# Patient Record
Sex: MALE | Race: OTHER | Employment: UNEMPLOYED | URBAN - NONMETROPOLITAN AREA
[De-identification: names, ages, dates, MRNs, and addresses within clinical notes are randomized per-mention and may not be internally consistent; named-entity substitution may affect disease eponyms.]

---

## 2021-05-17 ENCOUNTER — HOSPITAL ENCOUNTER (EMERGENCY)
Age: 26
Discharge: HOME | End: 2021-05-17
Attending: EMERGENCY MEDICINE
Payer: COMMERCIAL

## 2021-05-17 VITALS
HEART RATE: 90 BPM | WEIGHT: 210 LBS | SYSTOLIC BLOOD PRESSURE: 162 MMHG | OXYGEN SATURATION: 97 % | TEMPERATURE: 98.2 F | BODY MASS INDEX: 29.4 KG/M2 | RESPIRATION RATE: 18 BRPM | HEIGHT: 71 IN | DIASTOLIC BLOOD PRESSURE: 93 MMHG

## 2021-05-17 DIAGNOSIS — R30.0 DYSURIA: Primary | ICD-10-CM

## 2021-05-17 LAB
BACTERIA: ABNORMAL /HPF
BILIRUBIN URINE: NEGATIVE
BLOOD, URINE: ABNORMAL
CASTS 2: ABNORMAL /LPF
CASTS UA: ABNORMAL /LPF
CHARACTER, URINE: CLEAR
CHLAMYDIA TRACHOMATIS BY RT-PCR: NOT DETECTED
COLOR: ABNORMAL
CRYSTALS, UA: ABNORMAL
CT/NG SOURCE: NORMAL
EPITHELIAL CELLS, UA: ABNORMAL /HPF
GLUCOSE URINE: NEGATIVE MG/DL
KETONES, URINE: ABNORMAL
LEUKOCYTE ESTERASE, URINE: NEGATIVE
MISCELLANEOUS 2: ABNORMAL
NEISSERIA GONORRHOEAE BY RT-PCR: NOT DETECTED
NITRITE, URINE: NEGATIVE
PH UA: 5.5 (ref 5–9)
PROTEIN UA: ABNORMAL
RBC URINE: ABNORMAL /HPF
RENAL EPITHELIAL, UA: ABNORMAL
SPECIFIC GRAVITY, URINE: 1.03 (ref 1–1.03)
UROBILINOGEN, URINE: 0.2 EU/DL (ref 0–1)
WBC UA: ABNORMAL /HPF
YEAST: ABNORMAL

## 2021-05-17 PROCEDURE — 87491 CHLMYD TRACH DNA AMP PROBE: CPT

## 2021-05-17 PROCEDURE — 99281 EMR DPT VST MAYX REQ PHY/QHP: CPT

## 2021-05-17 PROCEDURE — 81001 URINALYSIS AUTO W/SCOPE: CPT

## 2021-05-17 PROCEDURE — 87591 N.GONORRHOEAE DNA AMP PROB: CPT

## 2021-05-17 RX ORDER — IBUPROFEN 400 MG/1
400 TABLET ORAL EVERY 6 HOURS PRN
Qty: 20 TABLET | Refills: 0 | Status: SHIPPED | OUTPATIENT
Start: 2021-05-17

## 2021-05-17 RX ORDER — CEFUROXIME AXETIL 500 MG/1
500 TABLET ORAL 2 TIMES DAILY
Qty: 14 TABLET | Refills: 0 | Status: SHIPPED | OUTPATIENT
Start: 2021-05-17 | End: 2021-05-24

## 2021-05-17 ASSESSMENT — PAIN DESCRIPTION - LOCATION: LOCATION: SCROTUM

## 2021-05-17 ASSESSMENT — PAIN SCALES - GENERAL: PAINLEVEL_OUTOF10: 2

## 2021-05-17 NOTE — ED PROVIDER NOTES
[place] ENCOUNTER          Pt Name: Katiana Garner  MRN: [id number]  [place] 1995  Date of evaluation: 5/17/2021  Treating Resident Physician: Di Moore MD  Supervising Physician: Regina Ndiaye COMPLAINT       Chief Complaint   Patient presents with    Other     burning with urination     History obtained from the patient. HISTORY OF PRESENT ILLNESS    HPI  Katiana Garner is a 26 y.o. male who presents to the emergency department for evaluation of burning on urination. Patient states that he stopped drinking alcohol 4 days ago, previously would have 8 ounces of 99 proof daily. After he stopped drinking alcohol, he noticed concern changed colors and was a bit darker. He started drinking more water thinking he is dehydrated. Urine color has returned to his baseline. Yesterday he began to have some burning on urination, and noted some sediment in his urine. He thinks there is also a little bit of blood in there. Patient has no suspicion of STDs. Patient denies any headaches, fever, nausea, vomiting, inability to sit still, visible or tactile hallucinations. The patient has no other acute complaints at this time. REVIEW OF SYSTEMS   Review of Systems   Constitutional: Negative for diaphoresis, fatigue and fever. HENT: Negative for congestion, rhinorrhea, sinus pain and sore throat. Eyes: Negative for photophobia and visual disturbance. Respiratory: Negative for chest tightness and shortness of breath. Gastrointestinal: Negative for abdominal distention, abdominal pain, constipation, diarrhea, nausea and vomiting. Genitourinary: Positive for dysuria, frequency and hematuria. Negative for decreased urine volume, discharge, flank pain, genital sores, penile pain, penile swelling, scrotal swelling, testicular pain and urgency. Musculoskeletal: Negative for back pain, neck pain and neck stiffness.    Neurological: Negative for tremors, weakness, light-headedness and headaches. PAST MEDICAL AND SURGICAL HISTORY   No past medical history on file. No past surgical history on file. MEDICATIONS   No current facility-administered medications for this encounter. Current Outpatient Medications:     cefUROXime (CEFTIN) 500 MG tablet, Take 1 tablet by mouth 2 times daily for 7 days, Disp: 14 tablet, Rfl: 0    ibuprofen (IBU) 400 MG tablet, Take 1 tablet by mouth every 6 hours as needed for Pain, Disp: 20 tablet, Rfl: 0      SOCIAL HISTORY     Social History     Social History Narrative    Not on file     Social History     Tobacco Use    Smoking status: Not on file   Substance Use Topics    Alcohol use: Not on file    Drug use: Not on file         ALLERGIES   No Known Allergies      FAMILY HISTORY   No family history on file. PREVIOUS RECORDS   Previous records reviewed: Past medical, past surgical, medications, allergies. PHYSICAL EXAM     ED Triage Vitals [05/17/21 1508]   BP Temp Temp Source Pulse Resp SpO2 Height Weight   (!) 162/93 98.2 °F (36.8 °C) Oral 90 18 97 % 5' 11\" (1.803 m) 210 lb (95.3 kg)     Initial vital signs and nursing assessment reviewed and Mildly hypertensive. Body mass index is 29.29 kg/m². Pulsoximetry is normal per my interpretation. Additional Vital Signs:  Vitals:    05/17/21 1508   BP: (!) 162/93   Pulse: 90   Resp: 18   Temp: 98.2 °F (36.8 °C)   SpO2: 97%       Physical Exam  Constitutional:       General: He is not in acute distress. Appearance: Normal appearance. He is not ill-appearing, toxic-appearing or diaphoretic. HENT:      Head: Normocephalic and atraumatic. Right Ear: External ear normal.      Left Ear: External ear normal.      Nose: Nose normal. No congestion. Mouth/Throat:      Mouth: Mucous membranes are moist.      Pharynx: Oropharynx is clear. Eyes:      General: No scleral icterus.      Extraocular Movements: Extraocular movements intact. Conjunctiva/sclera: Conjunctivae normal.      Pupils: Pupils are equal, round, and reactive to light. Cardiovascular:      Rate and Rhythm: Normal rate and regular rhythm. Pulses: Normal pulses. Heart sounds: Normal heart sounds. Pulmonary:      Effort: Pulmonary effort is normal. No respiratory distress. Breath sounds: Normal breath sounds. No wheezing. Abdominal:      General: Abdomen is flat. Bowel sounds are normal. There is no distension. Palpations: Abdomen is soft. Tenderness: There is no abdominal tenderness. There is no right CVA tenderness or left CVA tenderness. Musculoskeletal:         General: No swelling, tenderness or signs of injury. Normal range of motion. Cervical back: Normal range of motion and neck supple. No tenderness. Right lower leg: No edema. Left lower leg: No edema. Skin:     General: Skin is warm and dry. Capillary Refill: Capillary refill takes less than 2 seconds. Coloration: Skin is not jaundiced. Findings: No bruising or rash. Neurological:      Mental Status: He is alert. MEDICAL DECISION MAKING   Initial Assessment:   3 32-year-old male with no past medical history, presenting for burning on urination for the past 2 days. Physical exam unremarkable. Plan:    UA, GC   UA shows ketones, trace blood, protein.    Discharge patient with antibiotics for symptomatic cystitis   Strict return precautions discussed with patient        ED RESULTS   Laboratory results:  Labs Reviewed   URINE WITH REFLEXED MICRO - Abnormal; Notable for the following components:       Result Value    Ketones, Urine TRACE (*)     Blood, Urine TRACE (*)     Protein, UA TRACE (*)     Color, UA DK YELLOW (*)     All other components within normal limits   C. TRACHOMATIS / N. GONORRHOEAE, DNA       Radiologic studies results:  No orders to display       ED Medications administered this visit: Medications - No data to display      ED COURSE        Strict return precautions and follow up instructions were discussed with the patient prior to discharge, with which the patient agrees. MEDICATION CHANGES     Discharge Medication List as of 5/17/2021  4:06 PM      START taking these medications    Details   cefUROXime (CEFTIN) 500 MG tablet Take 1 tablet by mouth 2 times daily for 7 days, Disp-14 tablet, R-0Normal      ibuprofen (IBU) 400 MG tablet Take 1 tablet by mouth every 6 hours as needed for Pain, Disp-20 tablet, R-0Normal               FINAL DISPOSITION     Final diagnoses:   Dysuria     Condition: condition: good  Dispo: Discharge to home      This transcription was electronically signed. Parts of this transcriptions may have been dictated by use of voice recognition software and electronically transcribed, and parts may have been transcribed with the assistance of an ED scribe. The transcription may contain errors not detected in proofreading. Please refer to my supervising physician's documentation if my documentation differs.     Electronically Signed: Gloria Lima MD, 05/18/21, 7:20 AM          Gloria Lima MD  Resident  05/18/21 0846

## 2021-05-17 NOTE — ED PROVIDER NOTES
[address] [facility]  EMERGENCY MEDICINE ATTENDING ATTESTATION      Evaluation of Katiana Garner. Case discussed and care plan developed with resident physician. I agree with the resident physician documentation and plan as documented by her, except if my documentation differs. Patient seen, interviewed and examined by me. I reviewed the medical, surgical, family and social history, medications and allergies. I have reviewed the nursing documentation. I have reviewed the patient's vital signs and are abnormal from Hypertension per my interpretation. Body mass index is 29.29 kg/m². Pulsoxymetry is normal per my interpretation. Brief H&P   Patient c/o dysuria and frequent urination for the last 4 days. Patient also states he recently stopped drinking about a week ago and has not had anything with alcohol since. Denies symptoms related to withdrawal.  Denies urethral discharge. Patient states he recently moved from [facility] and does not currently have a PCP. Physical exam is notable for well appearing, nonfocal exam.  No CVA tenderness. Medical Decision Making   MDM:   1. Dysuria, possible UTI  Plan:    UA   Observation   We will likely start antibiotics and advise to follow-up with family medicine    Please see the resident physician completed note for final disposition except as documented on this attestation. I have reviewed and interpreted all available lab, radiology and ekg results available at the moment. Diagnosis, treatment and disposition plans were discussed and agreed upon by patient. This transcription was electronically signed. It was dictated by use of voice recognition software and electronically transcribed. The transcription may contain errors not detected in proofreading.      I performed direct supervision and was present for the critical portion following procedures: None  Critical care time on this case: None    Electronically signed

## 2021-05-18 ASSESSMENT — ENCOUNTER SYMPTOMS
VOMITING: 0
NAUSEA: 0
SHORTNESS OF BREATH: 0
ABDOMINAL DISTENTION: 0
CHEST TIGHTNESS: 0
PHOTOPHOBIA: 0
CONSTIPATION: 0
SORE THROAT: 0
SINUS PAIN: 0
RHINORRHEA: 0
BACK PAIN: 0
DIARRHEA: 0
ABDOMINAL PAIN: 0

## 2022-04-01 ENCOUNTER — HOSPITAL ENCOUNTER (EMERGENCY)
Age: 27
Discharge: HOME | End: 2022-04-01
Payer: COMMERCIAL

## 2022-04-01 ENCOUNTER — APPOINTMENT (OUTPATIENT)
Dept: GENERAL RADIOLOGY | Age: 27
End: 2022-04-01
Payer: COMMERCIAL

## 2022-04-01 VITALS
BODY MASS INDEX: 29.62 KG/M2 | HEIGHT: 69 IN | TEMPERATURE: 97.9 F | SYSTOLIC BLOOD PRESSURE: 153 MMHG | RESPIRATION RATE: 16 BRPM | HEART RATE: 109 BPM | WEIGHT: 200 LBS | DIASTOLIC BLOOD PRESSURE: 97 MMHG | OXYGEN SATURATION: 97 %

## 2022-04-01 DIAGNOSIS — S60.00XA CONTUSION OF LEFT HAND INCLUDING FINGERS, INITIAL ENCOUNTER: Primary | ICD-10-CM

## 2022-04-01 DIAGNOSIS — S60.222A CONTUSION OF LEFT HAND INCLUDING FINGERS, INITIAL ENCOUNTER: Primary | ICD-10-CM

## 2022-04-01 PROCEDURE — 73130 X-RAY EXAM OF HAND: CPT

## 2022-04-01 PROCEDURE — 99282 EMERGENCY DEPT VISIT SF MDM: CPT

## 2022-04-01 ASSESSMENT — PAIN DESCRIPTION - PAIN TYPE: TYPE: ACUTE PAIN

## 2022-04-01 ASSESSMENT — ENCOUNTER SYMPTOMS
SORE THROAT: 0
COLOR CHANGE: 0
SHORTNESS OF BREATH: 0
COUGH: 0
VOMITING: 0
DIARRHEA: 0

## 2022-04-01 ASSESSMENT — PAIN SCALES - GENERAL: PAINLEVEL_OUTOF10: 8

## 2022-04-01 ASSESSMENT — PAIN - FUNCTIONAL ASSESSMENT: PAIN_FUNCTIONAL_ASSESSMENT: 0-10

## 2022-04-01 ASSESSMENT — PAIN DESCRIPTION - ORIENTATION: ORIENTATION: LEFT

## 2022-04-01 ASSESSMENT — PAIN DESCRIPTION - DESCRIPTORS: DESCRIPTORS: BURNING

## 2022-04-01 ASSESSMENT — PAIN DESCRIPTION - FREQUENCY: FREQUENCY: CONTINUOUS

## 2022-04-01 ASSESSMENT — PAIN DESCRIPTION - LOCATION: LOCATION: HAND
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
## 2022-05-31 ENCOUNTER — APPOINTMENT (OUTPATIENT)
Dept: GENERAL RADIOLOGY | Age: 27
End: 2022-05-31
Payer: COMMERCIAL

## 2022-05-31 ENCOUNTER — HOSPITAL ENCOUNTER (EMERGENCY)
Age: 27
Discharge: HOME | End: 2022-05-31
Payer: COMMERCIAL

## 2022-05-31 VITALS
OXYGEN SATURATION: 98 % | HEART RATE: 87 BPM | SYSTOLIC BLOOD PRESSURE: 156 MMHG | DIASTOLIC BLOOD PRESSURE: 92 MMHG | TEMPERATURE: 98.1 F | RESPIRATION RATE: 16 BRPM

## 2022-05-31 DIAGNOSIS — S62.339A CLOSED BOXER'S FRACTURE, INITIAL ENCOUNTER: Primary | ICD-10-CM

## 2022-05-31 PROCEDURE — 73130 X-RAY EXAM OF HAND: CPT

## 2022-05-31 PROCEDURE — 6370000000 HC RX 637 (ALT 250 FOR IP): Performed by: PHYSICIAN ASSISTANT

## 2022-05-31 PROCEDURE — 99283 EMERGENCY DEPT VISIT LOW MDM: CPT

## 2022-05-31 RX ORDER — HYDROCODONE BITARTRATE AND ACETAMINOPHEN 5; 325 MG/1; MG/1
1 TABLET ORAL EVERY 6 HOURS PRN
Qty: 10 TABLET | Refills: 0 | Status: SHIPPED | OUTPATIENT
Start: 2022-05-31 | End: 2022-06-03

## 2022-05-31 RX ORDER — ONDANSETRON 4 MG/1
4 TABLET, ORALLY DISINTEGRATING ORAL ONCE
Status: COMPLETED | OUTPATIENT
Start: 2022-05-31 | End: 2022-05-31

## 2022-05-31 RX ORDER — HYDROCODONE BITARTRATE AND ACETAMINOPHEN 5; 325 MG/1; MG/1
1 TABLET ORAL ONCE
Status: COMPLETED | OUTPATIENT
Start: 2022-05-31 | End: 2022-05-31

## 2022-05-31 RX ORDER — ONDANSETRON 4 MG/1
4 TABLET, ORALLY DISINTEGRATING ORAL EVERY 8 HOURS PRN
Qty: 10 TABLET | Refills: 0 | Status: SHIPPED | OUTPATIENT
Start: 2022-05-31

## 2022-05-31 RX ADMIN — HYDROCODONE BITARTRATE AND ACETAMINOPHEN 1 TABLET: 5; 325 TABLET ORAL at 15:41

## 2022-05-31 RX ADMIN — ONDANSETRON 4 MG: 4 TABLET, ORALLY DISINTEGRATING ORAL at 15:41

## 2022-05-31 ASSESSMENT — ENCOUNTER SYMPTOMS
SHORTNESS OF BREATH: 0
RHINORRHEA: 0
VOMITING: 1
NAUSEA: 1
BACK PAIN: 0

## 2022-05-31 ASSESSMENT — PAIN SCALES - GENERAL: PAINLEVEL_OUTOF10: 10
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
## 2022-05-31 NOTE — ED PROVIDER NOTES
[facility] EMERGENCY DEPT      CHIEF COMPLAINT       Chief Complaint   Patient presents with    Hand Injury     left       Nurses Notes reviewed and I agree except as noted in the HPI. HISTORY OF PRESENT ILLNESS    Michelle Gruber is a 23 y.o. male who presents for left hand injury. Yesterday patient admits to drinking 4 twisted teas. He is not normally a drinker. Last evening after drinking he punched \"something\" with his left hand. He woke up with significant pain. The pain is causing nausea, sweats, and 1 episode of vomiting. He endorses mild intermittent paresthesias to the hand as well. Patient is right-hand dominant and denies any other injury. Patient denies headache, dizziness, neck pain, back pain, or other complaints. REVIEW OF SYSTEMS     Review of Systems   Constitutional: Positive for diaphoresis. Negative for fever. HENT: Negative for rhinorrhea. Eyes: Negative for visual disturbance. Respiratory: Negative for shortness of breath. Cardiovascular: Negative for chest pain. Gastrointestinal: Positive for nausea and vomiting. Musculoskeletal: Positive for myalgias. Negative for back pain, joint swelling and neck pain. Skin: Negative for rash and wound. Neurological: Negative for weakness and numbness. Psychiatric/Behavioral: Negative for confusion. PAST MEDICAL HISTORY    has no past medical history on file. SURGICAL HISTORY      has no past surgical history on file. CURRENT MEDICATIONS       Previous Medications    IBUPROFEN (IBU) 400 MG TABLET    Take 1 tablet by mouth every 6 hours as needed for Pain       ALLERGIES     has No Known Allergies. FAMILY HISTORY     has no family status information on file. family history is not on file. SOCIAL HISTORY        PHYSICAL EXAM     INITIAL VITALS:  oral temperature is 98.1 °F (36.7 °C). His blood pressure is 156/92 (abnormal) and his pulse is 87. His respiration is 16 and oxygen saturation is 98%. Physical Exam  Vitals and nursing note reviewed. Constitutional:       General: He is not in acute distress. Appearance: He is well-developed. He is not toxic-appearing or diaphoretic. HENT:      Head: Normocephalic and atraumatic. Right Ear: Hearing normal.      Left Ear: Hearing normal.      Nose: Nose normal.   Eyes:      General: Lids are normal.      Conjunctiva/sclera: Conjunctivae normal.   Neck:      Trachea: No tracheal deviation. Cardiovascular:      Rate and Rhythm: Normal rate and regular rhythm. Pulses:           Radial pulses are 2+ on the right side and 2+ on the left side. Pulmonary:      Effort: Pulmonary effort is normal. No tachypnea or respiratory distress. Breath sounds: No stridor. Abdominal:      General: There is no distension. Palpations: Abdomen is soft. Musculoskeletal:      Left wrist: Normal.      Left hand: Swelling, tenderness and bony tenderness present. Normal range of motion. Normal sensation. Hands:       Cervical back: No rigidity. Skin:     General: Skin is warm and dry. Coloration: Skin is not pale. Findings: No abrasion, ecchymosis or rash. Neurological:      Mental Status: He is alert and oriented to person, place, and time. GCS: GCS eye subscore is 4. GCS verbal subscore is 5. GCS motor subscore is 6. Sensory: No sensory deficit. Gait: Gait normal.   Psychiatric:         Speech: Speech normal.         Behavior: Behavior normal.         Thought Content:  Thought content normal.         DIFFERENTIAL DIAGNOSIS:   Including but not limited to: Boxer's fracture, contusion, sprain    DIAGNOSTIC RESULTS     EKG: All E[facility]'s are [facility] who either signs or Co-signs this chart in the absence of a cardiologist.  None    RADIOLOGY: non-plain film images(s) such as CT,Ultrasound and MRI are read by the radiologist.  Plain radiographic images are visualized and preliminarily interpreted by the emergency physician unless otherwise stated below. XR HAND LEFT (MIN 3 VIEWS)   Final Result   Angulated and minimally displaced fifth metacarpal fracture. **This report has been created using voice recognition software. It may contain minor errors which are inherent in voice recognition technology. **      Final report electronically signed by Dr. Goyo Kim MD on 5/31/2022 3:06 PM          LABS:   Labs Reviewed - No data to display    EMERGENCY DEPARTMENT COURSE:   Vitals:    Vitals:    05/31/22 1442   BP: (!) 156/92   Pulse: 87   Resp: 16   Temp: 98.1 °F (36.7 °C)   TempSrc: Oral   SpO2: 98%       Patient was seen in the emergency department during the global pandemic, when there was surge capacity and regional healthcare crisis. MDM:  The patient was seen and evaluated by me in the intake area. Vital signs were reviewed and noted stable. Physical exam revealed swelling, ecchymosis, and tenderness over the fourth and fifth metatarsal distal heads. Patient was able to demonstrate full range of motion of all 5 digits however with pain in the fourth and fifth. The patient was neurovascularly intact. Appropriate testing was ordered. Results were reviewed by me upon completion. Results showed angulated and minimally displaced fifth metacarpal fracture. Results were discussed with the patient and discharge plan was discussed. Patient was placed in an ulnar gutter splint and sling applied. I examined the patient post-splint application and the patient remained neurovascularly intact with good sensation and movement maintained distally. There were no signs of compartment symptoms before or after splint application. Patient medicated with Zofran and Norco.  Patient comfortable plan of discharge home and follow-up at [facility] tomorrow at 1:20.  I have given the patient strict written and verbal instructions about care at home, follow-up, and signs and symptoms of worsening of condition and they did verbalize understanding. CRITICAL CARE:   None    CONSULTS:  None    PROCEDURES:  None    FINAL IMPRESSION      1. Closed boxer's fracture, initial encounter          DISPOSITION/PLAN     1. Closed boxer's fracture, initial encounter        PATIENT REFERRED TO:  MelanieKrystin Farhana [address] [place] [id number]  [phone]    tomorrow, Wednesday June 1st at 1:20 PM.      DISCHARGE MEDICATIONS:  New Prescriptions    HYDROCODONE-ACETAMINOPHEN (NORCO) 5-325 MG PER TABLET    Take 1 tablet by mouth every 6 hours as needed for Pain for up to 3 days. Intended supply: 3 days.  Take lowest dose possible to manage pain    ONDANSETRON (ZOFRAN ODT) 4 MG DISINTEGRATING TABLET    Take 1 tablet by mouth every 8 hours as needed for Nausea       (Please note that portions of this note were completed with a voice recognition program.  Efforts were made to edit the dictations but occasionally words are mis-transcribed.)    Lyle Plata PA-C 05/31/22 4:02 PM    SALVADOR Gandhi PA-C  05/31/22 0291

## 2022-06-24 ENCOUNTER — HOSPITAL ENCOUNTER (EMERGENCY)
Age: 27
Discharge: HOME | End: 2022-06-24
Payer: COMMERCIAL

## 2022-06-24 VITALS
HEIGHT: 71 IN | RESPIRATION RATE: 16 BRPM | OXYGEN SATURATION: 99 % | WEIGHT: 190 LBS | SYSTOLIC BLOOD PRESSURE: 130 MMHG | BODY MASS INDEX: 26.6 KG/M2 | HEART RATE: 76 BPM | TEMPERATURE: 97.9 F | DIASTOLIC BLOOD PRESSURE: 96 MMHG

## 2022-06-24 DIAGNOSIS — S62.339S CLOSED BOXER'S FRACTURE, SEQUELA: ICD-10-CM

## 2022-06-24 DIAGNOSIS — Z48.02 VISIT FOR SUTURE REMOVAL: Primary | ICD-10-CM

## 2022-06-24 PROCEDURE — 99282 EMERGENCY DEPT VISIT SF MDM: CPT

## 2022-06-24 ASSESSMENT — PAIN DESCRIPTION - FREQUENCY: FREQUENCY: CONTINUOUS

## 2022-06-24 ASSESSMENT — ENCOUNTER SYMPTOMS: COLOR CHANGE: 0

## 2022-06-24 ASSESSMENT — PAIN - FUNCTIONAL ASSESSMENT: PAIN_FUNCTIONAL_ASSESSMENT: NONE - DENIES PAIN

## 2022-06-24 NOTE — ED TRIAGE NOTES
Patient presents to ER for suture removal of left hand. Patient reports having surgery on June 1rst. Patient reports girlfriend was going to take them out, but looks like his hand was eating the stitches.

## 2022-06-25 NOTE — ED PROVIDER NOTES
[facility] Emergency [address] [place] [address]       Chief Complaint   Patient presents with    Suture / Staple Removal       Nurses Notes reviewed and I agree except as noted in the HPI. HISTORY OF PRESENT ILLNESS    Concetta Rios is a 23 y.o. male who presents to the ED for evaluation of suture removal.  Patient notes he suffered a boxer's fracture near the end of the month of May, he had surgery performed at [address] [place] [address], he failed to follow-up due to problems with money. He notes no signs of infection associated with wound. He denies any numbness or tingling in his finger. He notes some tightness associate with sutures. He denies any significant past medical history. HPI was provided by the patient. REVIEW OF SYSTEMS     Review of Systems   Constitutional: Negative for activity change, chills and fever. Skin: Positive for wound. Negative for color change and rash. Hematological: Does not bruise/bleed easily. PAST MEDICAL HISTORY   No past medical history on file. SURGICALHISTORY      has no past surgical history on file. CURRENT MEDICATIONS       Discharge Medication List as of 6/24/2022  6:11 PM      CONTINUE these medications which have NOT CHANGED    Details   ondansetron (ZOFRAN ODT) 4 MG disintegrating tablet Take 1 tablet by mouth every 8 hours as needed for Nausea, Disp-10 tablet, R-0Print      ibuprofen (IBU) 400 MG tablet Take 1 tablet by mouth every 6 hours as needed for Pain, Disp-20 tablet, R-0Normal             ALLERGIES     has No Known Allergies. FAMILY HISTORY     has no family status information on file. family history is not on file.     SOCIAL HISTORY       Social History     Socioeconomic History    Marital status: Single     Spouse name: Not on file    Number of children: Not on file    Years of education: Not on file    Highest education level: Not on file   Occupational History    Not on file   Tobacco Use    Smoking status: Not on file    Smokeless tobacco: Not on file   Substance and Sexual Activity    Alcohol use: Not on file    Drug use: Not on file    Sexual activity: Not on file   Other Topics Concern    Not on file   Social History Narrative    Not on file     Social Determinants of Health     Financial Resource Strain:     Difficulty of Paying Living Expenses: Not on file   Food Insecurity:     Worried About Running Out of Food in the Last Year: Not on file    [place] of Food in the Last Year: Not on file   Transportation Needs:     Lack of Transportation (Medical): Not on file    Lack of Transportation (Non-Medical): Not on file   Physical Activity:     Days of Exercise per Week: Not on file    Minutes of Exercise per Session: Not on file   Stress:     Feeling of Stress : Not on file   Social Connections:     Frequency of Communication with Friends and Family: Not on file    Frequency of Social Gatherings with Friends and Family: Not on file    Attends Protestant Services: Not on file    Active Member of [address] [place] [address] [employer] or Organizations: Not on file    Attends Club or Organization Meetings: Not on file    Marital Status: Not on file   Intimate Partner Violence:     Fear of Current or Ex-Partner: Not on file    Emotionally Abused: Not on file    Physically Abused: Not on file    Sexually Abused: Not on file   Housing Stability:     Unable to Pay for Housing in the Last Year: Not on file    Number of [place] in the Last Year: Not on file    Unstable Housing in the Last Year: Not on file       PHYSICAL EXAM     INITIAL VITALS:  height is 5' 11\" (1.803 m) and weight is 190 lb (86.2 kg). His oral temperature is 97.9 °F (36.6 °C). His blood pressure is 130/96 (abnormal) and his pulse is 76. His respiration is 16 and oxygen saturation is 99%. Physical Exam  Vitals and nursing note reviewed. Constitutional:       Appearance: Normal appearance. He is well-developed. HENT:      Head: Normocephalic.       Right Ear: External ear normal.      Left Ear: External ear normal.      Nose: Nose normal.      Mouth/Throat:      Pharynx: Uvula midline. Eyes:      Conjunctiva/sclera: Conjunctivae normal.   Cardiovascular:      Rate and Rhythm: Normal rate and regular rhythm. Heart sounds: Normal heart sounds, S1 normal and S2 normal.   Pulmonary:      Effort: Pulmonary effort is normal. No respiratory distress. Breath sounds: Normal breath sounds. Chest:      Chest wall: No tenderness. Abdominal:      General: Bowel sounds are normal. There is no distension. Palpations: Abdomen is soft. Tenderness: There is no abdominal tenderness. Musculoskeletal:         General: Normal range of motion. Cervical back: Normal range of motion and neck supple. Skin:     General: Skin is warm and dry. Coloration: Skin is not pale. Findings: Wound (Wound to left outer hand well approximated with sutures.) present. No erythema or rash. Neurological:      Mental Status: He is alert and oriented to person, place, and time. Psychiatric:         Behavior: Behavior normal.         Thought Content: Thought content normal.         Judgment: Judgment normal.         DIFFERENTIAL DIAGNOSIS:   Suture removal    DIAGNOSTIC RESULTS          RADIOLOGY: non-plainfilm images(s) such as CT, Ultrasound and MRI are read by the radiologist.  Plain radiographic images are visualized and preliminarily interpreted by the emergency physician unless otherwise stated below. No orders to display         LABS:   Labs Reviewed - No data to display    EMERGENCY DEPARTMENT COURSE:   Vitals:    Vitals:    06/24/22 1715   BP: (!) 130/96   Pulse: 76   Resp: 16   Temp: 97.9 °F (36.6 °C)   TempSrc: Oral   SpO2: 99%   Weight: 190 lb (86.2 kg)   Height: 5' 11\" (1.803 m)       MDM    Patient was seen and evaluated in the emergency department, patient appeared to be in no acute distress, vital signs reviewed, no significant findings noted. Physical exam is completed, there is a well approximated wound to the left hand, sutures were removed with no difficulty, patient requested a new Ace bandage. He is advised to [facility] [place]. He verbalized understanding of plan of care. Medications - No data to display    Patient was seenindependently by myself. The patient's final impression and disposition and plan was determined by myself. CRITICAL CARE:   None    CONSULTS:  None    PROCEDURES:  None    FINAL IMPRESSION     1. Visit for suture removal    2. Closed boxer's fracture, sequela          DISPOSITION/PLAN   Patient discharged  PATIENT REFERREDTO:  Mike Delcid [address]  [facility] [address] [facility] [phone]  Go to   For follow up and evaluation      DISCHARGE MEDICATIONS:  Discharge Medication List as of 6/24/2022  6:11 PM          (Please note that portions of this note were completed with a voice recognition program.  Efforts were made to edit the dictations but occasionally words are mis-transcribed.)    Provider:  I personally performed the services described in the documentation,reviewed and edited the documentation which was dictated to the scribe in my presence, and it accurately records my words and actions.     Misha Herman CNP 06/24/22 8:32 PM    GELY Hahn - HAIM        [employer], APRN - CNP  06/24/22 2041

## 2022-07-09 ENCOUNTER — HOSPITAL ENCOUNTER (EMERGENCY)
Age: 27
Discharge: HOME | End: 2022-07-10
Payer: COMMERCIAL

## 2022-07-09 VITALS
OXYGEN SATURATION: 100 % | TEMPERATURE: 98.2 F | SYSTOLIC BLOOD PRESSURE: 142 MMHG | RESPIRATION RATE: 17 BRPM | DIASTOLIC BLOOD PRESSURE: 89 MMHG | HEART RATE: 83 BPM

## 2022-07-09 DIAGNOSIS — S61.511A WRIST LACERATION, RIGHT, INITIAL ENCOUNTER: Primary | ICD-10-CM

## 2022-07-09 PROCEDURE — 99283 EMERGENCY DEPT VISIT LOW MDM: CPT

## 2022-07-09 PROCEDURE — 12001 RPR S/N/AX/GEN/TRNK 2.5CM/<: CPT

## 2022-07-09 PROCEDURE — 6370000000 HC RX 637 (ALT 250 FOR IP): Performed by: NURSE PRACTITIONER

## 2022-07-09 RX ORDER — LIDOCAINE HYDROCHLORIDE 20 MG/ML
15 SOLUTION OROPHARYNGEAL ONCE
Status: COMPLETED | OUTPATIENT
Start: 2022-07-09 | End: 2022-07-09

## 2022-07-09 RX ADMIN — Medication 3 ML: at 22:38

## 2022-07-09 RX ADMIN — LIDOCAINE HYDROCHLORIDE 15 ML: 20 SOLUTION ORAL; TOPICAL at 22:38

## 2022-07-09 ASSESSMENT — PAIN SCALES - GENERAL: PAINLEVEL_OUTOF10: 8

## 2022-07-09 ASSESSMENT — PAIN DESCRIPTION - LOCATION: LOCATION: WRIST

## 2022-07-09 ASSESSMENT — PAIN DESCRIPTION - DESCRIPTORS: DESCRIPTORS: ACHING

## 2022-07-09 ASSESSMENT — PAIN DESCRIPTION - ORIENTATION: ORIENTATION: RIGHT

## 2022-07-09 ASSESSMENT — PAIN - FUNCTIONAL ASSESSMENT: PAIN_FUNCTIONAL_ASSESSMENT: 0-10

## 2022-07-10 NOTE — ED TRIAGE NOTES
Patient presents to ED with chief complaint of Right wrist laceration. Patient states that laceration is 2cm long, and he cut it on the lid of a soup can. Patient resting in bed. Aox4. Respirations easy and unlabored. No distress noted. Call light within reach.

## 2022-07-11 ASSESSMENT — ENCOUNTER SYMPTOMS
NAUSEA: 0
VOMITING: 0
BACK PAIN: 0
CHEST TIGHTNESS: 0
EYE REDNESS: 0
COUGH: 0
RHINORRHEA: 0
ABDOMINAL PAIN: 0

## 2022-07-11 NOTE — ED PROVIDER NOTES
[facility] Emergency Department    CHIEF COMPLAINT       Chief Complaint   Patient presents with    Laceration     right wrist       Nurses Notes reviewed and I agree except as noted in the HPI. HISTORY OF PRESENT ILLNESS    Erin Herrera sandra 23 y.o. male who presents to the ED for evaluation of a laceration to the right wrist.  Patient states he went to push the trash down in the can and there was a soup can lid there and it cut him. HPI was provided by the patient    REVIEW OF SYSTEMS     Review of Systems   Constitutional: Negative for chills, fatigue and fever. HENT: Negative for congestion, ear discharge, ear pain, postnasal drip and rhinorrhea. Eyes: Negative for redness. Respiratory: Negative for cough and chest tightness. Cardiovascular: Negative for chest pain and leg swelling. Gastrointestinal: Negative for abdominal pain, nausea and vomiting. Genitourinary: Negative for difficulty urinating, dysuria, enuresis, flank pain and hematuria. Musculoskeletal: Negative for back pain and joint swelling. Skin: Positive for wound. Negative for rash. Neurological: Negative for dizziness, light-headedness, numbness and headaches. Psychiatric/Behavioral: Negative for agitation, behavioral problems and confusion. All other systems negative except as noted. PAST MEDICAL HISTORY   No past medical history on file. SURGICALHISTORY      has no past surgical history on file. CURRENT MEDICATIONS       Discharge Medication List as of 7/9/2022 11:45 PM      CONTINUE these medications which have NOT CHANGED    Details   ondansetron (ZOFRAN ODT) 4 MG disintegrating tablet Take 1 tablet by mouth every 8 hours as needed for Nausea, Disp-10 tablet, R-0Print      ibuprofen (IBU) 400 MG tablet Take 1 tablet by mouth every 6 hours as needed for Pain, Disp-20 tablet, R-0Normal             ALLERGIES     has No Known Allergies.     FAMILY HISTORY     has no family status information on file.    family history is not on file. SOCIAL HISTORY       Social History     Socioeconomic History    Marital status: Single     Spouse name: Not on file    Number of children: Not on file    Years of education: Not on file    Highest education level: Not on file   Occupational History    Not on file   Tobacco Use    Smoking status: Not on file    Smokeless tobacco: Not on file   Substance and Sexual Activity    Alcohol use: Not on file    Drug use: Not on file    Sexual activity: Not on file   Other Topics Concern    Not on file   Social History Narrative    Not on file     Social Determinants of Health     Financial Resource Strain:     Difficulty of Paying Living Expenses: Not on file   Food Insecurity:     Worried About Running Out of Food in the Last Year: Not on file    [place] of Food in the Last Year: Not on file   Transportation Needs:     Lack of Transportation (Medical): Not on file    Lack of Transportation (Non-Medical): Not on file   Physical Activity:     Days of Exercise per Week: Not on file    Minutes of Exercise per Session: Not on file   Stress:     Feeling of Stress : Not on file   Social Connections:     Frequency of Communication with Friends and Family: Not on file    Frequency of Social Gatherings with Friends and Family: Not on file    Attends [employer] Services: Not on file    Active Member of [address] [place] [address] [employer] or Organizations: Not on file    Attends Club or Organization Meetings: Not on file    Marital Status: Not on file   Intimate Partner Violence:     Fear of Current or Ex-Partner: Not on file    Emotionally Abused: Not on file    Physically Abused: Not on file    Sexually Abused: Not on file   Housing Stability:     Unable to Pay for Housing in the Last Year: Not on file    Number of [place] in the Last Year: Not on file    Unstable Housing in the Last Year: Not on file       PHYSICAL EXAM     INITIAL VITALS:  temperature is 98.2 °F (36.8 °C).  His blood pressure is 142/89 (abnormal) and his pulse is 83. His respiration is 17 and oxygen saturation is 100%. Physical Exam  Constitutional:       Appearance: Normal appearance. He is well-developed. He is not ill-appearing. HENT:      Head: Normocephalic and atraumatic. Nose: Nose normal.      Mouth/Throat:      Mouth: Mucous membranes are moist.      Pharynx: Oropharynx is clear. Eyes:      Conjunctiva/sclera: Conjunctivae normal.   Cardiovascular:      Rate and Rhythm: Normal rate. Pulses: Normal pulses. Pulmonary:      Effort: Pulmonary effort is normal.   Abdominal:      Palpations: Abdomen is soft. Musculoskeletal:         General: Signs of injury present. Normal range of motion. Arms:       Cervical back: Normal range of motion. Skin:     General: Skin is warm and dry. Capillary Refill: Capillary refill takes less than 2 seconds. Neurological:      General: No focal deficit present. Mental Status: He is alert and oriented to person, place, and time. Psychiatric:         Behavior: Behavior normal.               DIFFERENTIAL DIAGNOSIS:   laceration    DIAGNOSTIC RESULTS     EKG: All EKG's are interpreted by the Emergency Department Physician who eithersigns or Co-signs this chart in the absence of a cardiologist.        RADIOLOGY: non-plainfilm images(s) such as CT, Ultrasound and MRI are read by the radiologist.  Plain radiographic images are visualized and preliminarily interpreted by the emergency physician unless otherwise stated below.   No orders to display         LABS:   Labs Reviewed - No data to display    EMERGENCY DEPARTMENT COURSE:   Vitals:    Vitals:    07/09/22 2202 07/09/22 2205   BP:  (!) 142/89   Pulse: 83    Resp: 17    Temp: 98.2 °F (36.8 °C)    SpO2: 100%                                       Internal Administration   First Dose      Second Dose           Last COVID Lab No results found for: SARS-COV-2, SARS-COV-2 RNA, SARS-COV-2, SARS-COV-2, SARS-COV-2 BY PCR, SARS-COV-2, SARS-COV-2, SARS-COV-2         [facility]    Patient was seen evaluate in the emergency room for a laceration to the right wrist.  No labs or imaging are necessary. Patient is treated with let and viscous lidocaine. Is applied to the wound. Wound is closed per the procedure note. Patient tolerated well. Discharged home stable condition after education on wound care. No notes of  Admission Criteria type on file. Medications   lidocaine-EPINEPHrine-tetracaine (LET) topical solution 3 mL syringe (3 mLs Topical Given 7/9/22 2238)   lidocaine viscous hcl (XYLOCAINE) 2 % solution 15 mL (15 mLs Oral Given 7/9/22 2238)       Please note that the patient was evaluated during a pandemic. All efforts were made for HIPPA compliance as well as provision of appropriate care. Patient was seen independently by myself. The patient's final impression and disposition and plan was determined by myself. Strict return precautions and follow up instructions were discussed with the patient prior to discharge, with which the patient agrees. Physical assessment findings, diagnostic testing(s) if applicable, and vital signs reviewed with patient/patient representative. Questions answered. Medications asdirected, including OTC medications for supportive care. Education provided on medications. Differential diagnosis(s) discussed with patient/patient representative. Home care/self care instructions reviewed withpatient/patient representative. Patient is to follow-up with family care provider in 2-3 days if no improvement. Patient is to go to the emergency department if symptoms worsen. Patient/patient representative isaware of care plan, questions answered, verbalizes understanding and is in agreement.      CRITICAL CARE:   None    CONSULTS:  None    PROCEDURES:  Lac Repair    Date/Time: 7/11/2022 3:45 AM  Performed by: GELY James - CNP  Authorized by: Arleth Fiore APRN - CNP     Consent:     Consent obtained:  Verbal    Consent given by:  Patient    Risks discussed:  Infection, pain, retained foreign body, vascular damage, poor wound healing, poor cosmetic result, need for additional repair and nerve damage    Alternatives discussed:  No treatment, delayed treatment, observation and referral  Anesthesia (see MAR for exact dosages): Anesthesia method:  Topical application    Topical anesthetic:  LET and lidocaine gel  Laceration details:     Location:  Shoulder/arm    Shoulder/arm location:  R lower arm    Length (cm):  1.5  Repair type:     Repair type:  Simple  Pre-procedure details:     Preparation:  Patient was prepped and draped in usual sterile fashion  Exploration:     Hemostasis achieved with:  Direct pressure    Wound exploration: wound explored through full range of motion and entire depth of wound probed and visualized      Wound extent: no areolar tissue violation noted, no fascia violation noted, no foreign bodies/material noted, no muscle damage noted, no nerve damage noted, no tendon damage noted, no underlying fracture noted and no vascular damage noted      Contaminated: no    Treatment:     Area cleansed with:  Jovita-Clens    Amount of cleaning:  Standard    Irrigation solution:  Sterile saline    Irrigation volume:  20    Irrigation method:  Syringe    Visualized foreign bodies/material removed: no    Skin repair:     Repair method:  Sutures    Suture size:  4-0    Suture material:  Nylon    Suture technique:  Simple interrupted    Number of sutures:  3  Approximation:     Approximation:  Close  Post-procedure details:     Dressing:  Bulky dressing    Patient tolerance of procedure: Tolerated well, no immediate complications        FINAL IMPRESSION     1.  Wrist laceration, right, initial encounter          DISPOSITION/PLAN   DISPOSITION Decision To Discharge 07/10/2022 12:10:05 AM      PATIENT REFERREDTO:  [facility]  [address]  [facility]  Schedule an appointment as soon as possible for a visit in 3 days  For follow up, For wound re-check      DISCHARGE MEDICATIONS:  Discharge Medication List as of 7/9/2022 11:45 PM          (Please note that portions of this note were completed with a voice recognition program.  Efforts were made to edit the dictations but occasionally words are mis-transcribed.)         GELY Camacho - GELY Caalbrese CNP  07/11/22 5646

## 2022-09-07 ENCOUNTER — HOSPITAL ENCOUNTER (EMERGENCY)
Age: 27
Discharge: HOME | End: 2022-09-07
Payer: COMMERCIAL

## 2022-09-07 VITALS
HEART RATE: 85 BPM | HEIGHT: 70 IN | DIASTOLIC BLOOD PRESSURE: 88 MMHG | BODY MASS INDEX: 32.93 KG/M2 | OXYGEN SATURATION: 98 % | TEMPERATURE: 99 F | RESPIRATION RATE: 16 BRPM | SYSTOLIC BLOOD PRESSURE: 146 MMHG | WEIGHT: 230 LBS

## 2022-09-07 DIAGNOSIS — U07.1 COVID-19: Primary | ICD-10-CM

## 2022-09-07 LAB
FLU A ANTIGEN: NEGATIVE
FLU B ANTIGEN: NEGATIVE
SARS-COV-2, NAAT: DETECTED

## 2022-09-07 PROCEDURE — 99283 EMERGENCY DEPT VISIT LOW MDM: CPT

## 2022-09-07 PROCEDURE — 87635 SARS-COV-2 COVID-19 AMP PRB: CPT

## 2022-09-07 PROCEDURE — 87804 INFLUENZA ASSAY W/OPTIC: CPT

## 2022-09-07 ASSESSMENT — ENCOUNTER SYMPTOMS
SHORTNESS OF BREATH: 0
BACK PAIN: 0
SORE THROAT: 1
EYES NEGATIVE: 1
RHINORRHEA: 0
ABDOMINAL PAIN: 0
COUGH: 1
VOMITING: 0
DIARRHEA: 0
NAUSEA: 0
SINUS PAIN: 0
SINUS PRESSURE: 0

## 2022-09-07 ASSESSMENT — PAIN DESCRIPTION - FREQUENCY: FREQUENCY: CONTINUOUS

## 2022-09-07 ASSESSMENT — PAIN DESCRIPTION - DESCRIPTORS: DESCRIPTORS: ACHING

## 2022-09-07 ASSESSMENT — PAIN DESCRIPTION - LOCATION: LOCATION: HEAD

## 2022-09-07 ASSESSMENT — PAIN - FUNCTIONAL ASSESSMENT: PAIN_FUNCTIONAL_ASSESSMENT: 0-10

## 2022-09-07 ASSESSMENT — PAIN DESCRIPTION - PAIN TYPE: TYPE: ACUTE PAIN

## 2022-09-07 NOTE — DISCHARGE INSTRUCTIONS
Recommend taking Vitamin C 500 mg twice daily, zinc  mg daily (for no more than one month), Vitamin D3 1000 units and slow release melatonin to help with the symptoms. Return to the Emergency Department immediately if you develop worsening shortness of breath, fatigue, chest pain, or passing out. Follow up with your primary care doctor by calling the office tomorrow    Prevention steps for  People with confirmed or suspected COVID-19 (including persons under investigation) who do not need to be hospitalized  and   People with confirmed COVID-19 who were hospitalized and determined to be medically stable to go home  Your healthcare provider and public health staff will evaluate whether you can be cared for at home. If it is determined that you do not need to be hospitalized and can be isolated at home, you will be monitored by staff from your local or state health department. You should follow the prevention steps below until a healthcare provider or local or state health department says you can return to your normal activities. Stay home except to get medical care  People who are mildly ill with COVID-19 are able to isolate at home during their illness. You should restrict activities outside your home, except for getting medical care. Do not go to work, school, or public areas. Avoid using public transportation, ride-sharing, or taxis. Separate yourself from other people and animals in your home  People: As much as possible, you should stay in a specific room and away from other people in your home. Also, you should use a separate bathroom, if available. Animals: You should restrict contact with pets and other animals while you are sick with COVID-19, just like you would around other people.  Although there have not been reports of pets or other animals becoming sick with COVID-19, it is still recommended that people sick with COVID-19 limit contact with animals until more information is known about the virus. When possible, have another member of your household care for your animals while you are sick. If you are sick with COVID-19, avoid contact with your pet, including petting, snuggling, being kissed or licked, and sharing food. If you must care for your pet or be around animals while you are sick, wash your hands before and after you interact with pets and wear a facemask. See COVID-19 and Animals for more information. Call ahead before visiting your doctor  If you have a medical appointment, call the healthcare provider and tell them that you have or may have COVID-19. This will help the healthcare providers office take steps to keep other people from getting infected or exposed. Wear a facemask  You should wear a facemask when you are around other people (e.g., sharing a room or vehicle) or pets and before you enter a healthcare providers office. If you are not able to wear a facemask (for example, because it causes trouble breathing), then people who live with you should not stay in the same room with you, or they should wear a facemask if they enter your room. Cover your coughs and sneezes  Cover your mouth and nose with a tissue when you cough or sneeze. Throw used tissues in a lined trash can. Immediately wash your hands with soap and water for at least 20 seconds or, if soap and water are not available, clean your hands with an alcohol-based hand  that contains at least 60% alcohol. Clean your hands often  Wash your hands often with soap and water for at least 20 seconds, especially after blowing your nose, coughing, or sneezing; going to the bathroom; and before eating or preparing food. If soap and water are not readily available, use an alcohol-based hand  with at least 60% alcohol, covering all surfaces of your hands and rubbing them together until they feel dry. Soap and water are the best option if hands are visibly dirty.  Avoid touching your eyes, nose, and mouth with secondary transmission to others is thought to be low. The decision to discontinue home isolation precautions should be made on a case-by-case basis, in consultation with healthcare providers and state and [facility] health departments. [employer] departments:  Guy Garcia [phone] [address] [facility]  Mitzi Phillip [phone]  [facility]  [address] [facility]  [address] [phone]  Sherrin Rinne [phone]  You can call [phone] or [phone] 24 hours a day. Recommended precautions for household members, intimate partners, and caregivers in a nonhealthcare setting1 of  A patient with symptomatic laboratory-confirmed COVID-19  or  A patient under investigation  Household members, intimate partners, and caregivers in a nonhealthcare setting may have close contact2 with a person with symptomatic, laboratory-confirmed COVID-19 or a person under investigation. Close contacts should monitor their health; they should call their healthcare provider right away if they develop symptoms suggestive of COVID-19 (e.g., fever, cough, shortness of breath) (see Interim [place] Guidance for Risk Assessment and Public Health Management of Persons with Potential Coronavirus Disease 2019 (COVID-19) Exposure in Travel-associated or [facility] Travelers.)  Close contacts should also follow these recommendations:  Make sure that you understand and can help the patient follow their healthcare providers instructions for medication(s) and care. You should help the patient with basic needs in the home and provide support for getting groceries, prescriptions, and other personal needs. Monitor the patients symptoms. If the patient is getting sicker, call his or her healthcare provider and tell them that the patient has laboratory-confirmed COVID-19.  This will help the healthcare providers office take steps to keep other people in the office or waiting room from getting infected. Ask the healthcare provider to call the local or state health department for additional guidance. If the patient has a medical emergency and you need to call 911, notify the dispatch personnel that the patient has, or is being evaluated for COVID-19. Household members should stay in another room or be  from the patient as much as possible. Household members should use a separate bedroom and bathroom, if available. Prohibit visitors who do not have an essential need to be in the home. Household members should care for any pets in the home. Do not handle pets or other animals while sick. For more information, see COVID-19 and Animals. Make sure that shared spaces in the home have good air flow, such as by an air conditioner or an opened window, weather permitting. Perform hand hygiene frequently. Wash your hands often with soap and water for at least 20 seconds or use an alcohol-based hand  that contains 60 to 95% alcohol, covering all surfaces of your hands and rubbing them together until they feel dry. Soap and water should be used preferentially if hands are visibly dirty. Avoid touching your eyes, nose, and mouth with unwashed hands. The patient should wear a facemask when you are around other people. If the patient is not able to wear a facemask (for example, because it causes trouble breathing), you, as the caregiver, should wear a mask when you are in the same room as the patient. Wear a disposable facemask and gloves when you touch or have contact with the patients blood, stool, or body fluids, such as saliva, sputum, nasal mucus, vomit, urine. Throw out disposable facemasks and gloves after using them. Do not reuse. When removing personal protective equipment, first remove and dispose of gloves.  Then, immediately clean your hands with soap and water or alcohol-based hand . Next, remove and dispose of facemask, and immediately clean your hands again with soap and water or alcohol-based hand . Avoid sharing household items with the patient. You should not share dishes, drinking glasses, cups, eating utensils, towels, bedding, or other items. After the patient uses these items, you should wash them thoroughly (see below [employer]). Clean all high-touch surfaces, such as counters, tabletops, doorknobs, bathroom fixtures, toilets, phones, keyboards, tablets, and bedside tables, every day. Also, clean any surfaces that may have blood, stool, or body fluids on them. Use a household cleaning spray or wipe, according to the label instructions. Labels contain instructions for safe and effective use of the cleaning product including precautions you should take when applying the product, such as wearing gloves and making sure you have good ventilation during use of the product. [address] thoroughly. Immediately remove and wash clothes or bedding that have blood, stool, or body fluids on them. Wear disposable gloves while handling soiled items and keep soiled items away from your body. Clean your hands (with soap and water or an alcohol-based hand ) immediately after removing your gloves. Read and follow directions on labels of laundry or clothing items and detergent. In general, using a normal laundry detergent according to washing machine instructions and dry thoroughly using the warmest temperatures recommended on the clothing label. Place all used disposable gloves, facemasks, and other contaminated items in a lined container before disposing of them with other household waste. Clean your hands (with soap and water or an alcohol-based hand ) immediately after handling these items. Soap and water should be used preferentially if hands are visibly dirty.    Discuss any additional questions with your state or local health department or healthcare provider. Check available hours when contacting your local health department.

## 2022-09-07 NOTE — ED PROVIDER NOTES
[address] [facility] [address] [address] EMERGENCY DEPT    EMERGENCY MEDICINE     Pt Name: Kateryna Domínguez  MRN: [id number]  [place] 1995  Date of evaluation: 9/7/2022  Provider: Wai Rcio PA-C    CHIEF COMPLAINT       Chief Complaint   Patient presents with    Covid Testing       HISTORY OF PRESENT ILLNESS    Kateryna Domínguez is a pleasant 23 y.o. male who presents to the emergency department for COVID testing. Patient states his roommate recently tested positive for COVID and yesterday he started having symptoms of mild cough and congestion. He states this morning he woke up with a headache, body aches, mild sore throat, cough and congestion. No complaints of chest pain, shortness of breath, nausea, vomiting, diarrhea, leg or arm pain or swelling. 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 No other concerns or complaints otherwise. Triage notes and Nursing notes were reviewed by myself. Any discrepancies are addressed above. PAST MEDICAL HISTORY   No past medical history on file. SURGICAL HISTORY     No past surgical history on file. CURRENT MEDICATIONS       Current Discharge Medication List        CONTINUE these medications which have NOT CHANGED    Details   ondansetron (ZOFRAN ODT) 4 MG disintegrating tablet Take 1 tablet by mouth every 8 hours as needed for Nausea  Qty: 10 tablet, Refills: 0      ibuprofen (IBU) 400 MG tablet Take 1 tablet by mouth every 6 hours as needed for Pain  Qty: 20 tablet, Refills: 0             ALLERGIES     Patient has no known allergies. FAMILY HISTORY     No family history on file. SOCIAL HISTORY       Social History     Socioeconomic History    Marital status: Single       REVIEW OF SYSTEMS     Review of Systems   Constitutional:  Negative for chills and fever. HENT:  Positive for congestion and sore throat. Negative for ear pain, rhinorrhea, sinus pressure and sinus pain. Eyes: Negative.     Respiratory: Positive for cough. Negative for shortness of breath. Cardiovascular:  Negative for chest pain and palpitations. Gastrointestinal:  Negative for abdominal pain, diarrhea, nausea and vomiting. Endocrine: Negative. Genitourinary:  Negative for dysuria and frequency. Musculoskeletal:  Positive for myalgias. Negative for back pain and neck pain. Skin:  Negative for rash. Neurological:  Positive for headaches. Negative for dizziness and light-headedness. Hematological: Negative. Psychiatric/Behavioral: Negative. All other systems reviewed and are negative. Except as noted above the remainder of the review of systems was reviewed and is. SCREENINGS        Claire Coma Scale  Eye Opening: Spontaneous  Best Verbal Response: Oriented  Best Motor Response: Obeys commands  [place] Coma Scale Score: 15                 PHYSICAL EXAM     INITIAL VITALS:  height is 5' 10\" (1.778 m) and weight is 230 lb (104.3 kg). His oral temperature is 99 °F (37.2 °C). His blood pressure is 146/88 (abnormal) and his pulse is 85. His respiration is 16 and oxygen saturation is 98%. Physical Exam  Vitals and nursing note reviewed. Constitutional:       General: He is not in acute distress. Appearance: Normal appearance. He is normal weight. He is not ill-appearing, toxic-appearing or diaphoretic. HENT:      Head: Normocephalic and atraumatic. Right Ear: Tympanic membrane, ear canal and external ear normal. There is no impacted cerumen. Left Ear: Tympanic membrane, ear canal and external ear normal. There is no impacted cerumen. Nose: Congestion and rhinorrhea present. Mouth/Throat:      Mouth: Mucous membranes are moist.      Pharynx: Oropharynx is clear. Posterior oropharyngeal erythema present. No oropharyngeal exudate. Eyes:      Extraocular Movements: Extraocular movements intact.       Conjunctiva/sclera: Conjunctivae normal.      Pupils: Pupils are equal, round, and reactive to light. Cardiovascular:      Rate and Rhythm: Normal rate and regular rhythm. Pulses: Normal pulses. Heart sounds: Normal heart sounds. No murmur heard. Pulmonary:      Effort: Pulmonary effort is normal. No respiratory distress. Breath sounds: Normal breath sounds. No wheezing, rhonchi or rales. Chest:      Chest wall: No tenderness. Abdominal:      General: Bowel sounds are normal. There is no distension. Palpations: Abdomen is soft. Tenderness: There is no abdominal tenderness. There is no right CVA tenderness, left CVA tenderness, guarding or rebound. Musculoskeletal:         General: Normal range of motion. Cervical back: Normal range of motion and neck supple. Skin:     General: Skin is warm and dry. Capillary Refill: Capillary refill takes less than 2 seconds. Neurological:      Mental Status: He is alert and oriented to person, place, and time. GCS: GCS eye subscore is 4. GCS verbal subscore is 5. GCS motor subscore is 6. Psychiatric:         Mood and Affect: Mood normal.         Behavior: Behavior normal.         Thought Content: Thought content normal.       DIFFERENTIAL DIAGNOSIS:   Differential diagnoses are discussed    DIAGNOSTIC RESULTS     EKG:(none if blank)  All EKGs are interpreted by the Emergency Department Physician who either signs or Co-signs this chart in the absence of a cardiologist.          RADIOLOGY: (none if blank)   I directly visualized the following images and reviewed the radiologist interpretations. Interpretation per the Radiologist below, if available at the time of this note:  No orders to display       LABS:   Labs Reviewed   COVID-19, RAPID - Abnormal; Notable for the following components:       Result Value    SARS-CoV-2, NAAT DETECTED (*)     All other components within normal limits   RAPID INFLUENZA A/B ANTIGENS       All other labs were within normal range or not returned as of this dictation.   Please note, any cultures that may have been sent were not resulted at the time of this patient visit. EMERGENCY DEPARTMENT COURSE:   Vitals:    Vitals:    09/07/22 1510   BP: (!) 146/88   Pulse: 85   Resp: 16   Temp: 99 °F (37.2 °C)   TempSrc: Oral   SpO2: 98%   Weight: 230 lb (104.3 kg)   Height: 5' 10\" (1.778 m)     4:13 PM EDT: The patient was seen and evaluated. PROCEDURES: (None if blank)  Procedures         ED Medications administered this visit:  Medications - No data to display    MDM:  Patient is 28-year-old male who came to the ED to be evaluated for COVID testing. Appropriate testing/imaging of rapid COVID was done based on the patient's initial complaints, history, and physical exam.   Pertinent results were positive for COVID-19. Discussed findings with patient. Patient's vitals are stable and afebrile. Recommend symptomatic care at this point with Tylenol/Motrin for body aches and fevers, rest, plenty of clear fluid oral hydration, good nutrition, vitamin C, D, zinc.  Quarantine for 5 days starting with symptoms started with additional 5 days of face mask use. Follow-up with PCP in 1 week as needed. If worsening symptoms of shortness of breath, chest pain may follow-up to the ED for reevaluation. Patient was seen independently by myself. The patient's final impression and disposition and plan was determined by myself. Strict return precautions and follow up instructions were discussed with the patient prior to discharge, with which the patient agrees. Physical assessment findings, diagnostic testing(s) if applicable, and vital signs reviewed with patient/patient representative. Questions answered. Medications as directed, including OTC medications for supportive care. Education provided on medications. Differential diagnosis(s) discussed with patient/patient representative. Home care/self care instructions reviewed with patient/patient representative.   Patient is to follow-up with family care provider in 7 days if no improvement. Patient is to go to the emergency department if symptoms worsen. Patient/patient representative is aware of care plan, questions answered, verbalizes understanding and is in agreement.      CRITICAL CARE:   None    CONSULTS:  None    PROCEDURES:  None    FINAL IMPRESSION      1. COVID-19          DISPOSITION/PLAN   Discharge home    PATIENT REFERRED TO:  PCP    In 1 week  As needed    [facility] EMERGENCY DEPT  [address]  [phone]  In 2 days  If symptoms worsen      DISCHARGEMEDICATIONS:  Current Discharge Medication List               (Please note that portions of this note were completed with a voice recognition program.  Efforts were made to edit the dictations but occasionallywords are mis-transcribed.)      Meghna Wheeler PA-C(electronically signed)  Physician Associate, Emergency Department        Meghna Wheeler PA-C  09/07/22 2623

## 2022-09-07 NOTE — ED TRIAGE NOTES
Patient presents to ER with complaints of headache and Covid symptoms after being exposed to room mate who tested positive. 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

## 2022-09-07 NOTE — Clinical Note
Trinity Mar was seen and treated in our emergency department on 9/7/2022. He may return to work on 09/11/2022. Recommend quarantine for 5 days starting when symptoms started and then may return with use of face mask for additional 5 days. If you have any questions or concerns, please don't hesitate to call.       Jerris Gottron, PA-C

## 2022-09-08 ENCOUNTER — CARE COORDINATION (OUTPATIENT)
Dept: CARE COORDINATION | Age: 27
End: 2022-09-08

## 2022-09-09 NOTE — CARE COORDINATION
Attempted to reach patient for covid-19 f/u s/p recent ED visit for concerns of COVID-19 s/p recent exposure and c/o cough, body aches, headache, and sore throat. COVID-19 testing was completed and results were positive. Patient was not available at the time of my call and generic voicemail message was left asking patient to please return call to my direct number. Will continue to attempt to f/u with patient in the future.
guidelines. ACM reviewed discharge instructions, medical action plan and red flag symptoms with the patient who verbalized understanding. Discussed COVID vaccination status: No.  Discussed exposure protocols and quarantine with CDC Guidelines. Patient was given an opportunity to verbalize any questions and concerns and agrees to contact ACM or health care provider for questions related to their healthcare. Reviewed and educated patient on any new and changed medications related to discharge diagnosis     Was patient discharged with a pulse oximeter? no      ACM provided contact information. Plan for follow-up call in 3-5 days based on severity of symptoms and risk factors. Patient called for covid-19 f/u s/p recent ED visit for c/o cough, congestion, headache, body aches, and sore throat. Patient denied any new/change/worsening of symptoms. Patient reported he is doing \"ok\" and he denied any questions re: his discharge instructions. Patient educated on the need to f/u with PCP and he reported he will call and schedule on his own and he declined any assistance. COVID-19 education and zone information was reviewed with patient. Patient was educated on signs/symptoms to report as well as the importance of early symptom recognition and follow up. Patient verbalized understanding. ACM reviewed need to self quarantine as directed and patient acknowledged understanding. Patient denied any other questions, concerns, or needs.

## 2022-09-13 ENCOUNTER — CARE COORDINATION (OUTPATIENT)
Dept: CARE COORDINATION | Age: 27
End: 2022-09-13

## 2022-12-29 ENCOUNTER — APPOINTMENT (OUTPATIENT)
Dept: GENERAL RADIOLOGY | Age: 27
End: 2022-12-29
Payer: COMMERCIAL

## 2022-12-29 ENCOUNTER — HOSPITAL ENCOUNTER (EMERGENCY)
Age: 27
Discharge: HOME | End: 2022-12-29
Payer: COMMERCIAL

## 2022-12-29 VITALS
TEMPERATURE: 98.1 F | SYSTOLIC BLOOD PRESSURE: 144 MMHG | OXYGEN SATURATION: 98 % | RESPIRATION RATE: 18 BRPM | DIASTOLIC BLOOD PRESSURE: 79 MMHG | HEART RATE: 69 BPM

## 2022-12-29 DIAGNOSIS — S20.211A RIB CONTUSION, RIGHT, INITIAL ENCOUNTER: Primary | ICD-10-CM
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
## 2022-12-29 PROCEDURE — 99283 EMERGENCY DEPT VISIT LOW MDM: CPT

## 2022-12-29 PROCEDURE — 71101 X-RAY EXAM UNILAT RIBS/CHEST: CPT

## 2022-12-29 NOTE — Clinical Note
Bright Galindo was seen and treated in our emergency department on 12/29/2022. He may return to work on . If you have any questions or concerns, please don't hesitate to call.       Pati Brody PA-C

## 2022-12-29 NOTE — Clinical Note
Yee Hairston was seen and treated in our emergency department on 12/29/2022. He may return to work on 12/31/2022. If you have any questions or concerns, please don't hesitate to call.       Autumn Gary PA-C

## 2022-12-29 NOTE — ED NOTES
Pt to the ED via self. Patient presents with complaints of right rib injury after tripping and hitting his ribs on a sink. Patient states that this occurred a couple days ago but is still having pain. Patient is alert and oriented x 4. Respirations are regular and unlabored.       Rober Odom RN  12/29/22 7008

## 2022-12-29 NOTE — Clinical Note
Xin Krishnamurthy was seen and treated in our emergency department on 12/29/2022. He may return to work on . If you have any questions or concerns, please don't hesitate to call.       Manuel Varela PA-C

## 2022-12-30 NOTE — ED PROVIDER NOTES
[address] [facility] [address] [address] EMERGENCY DEPT      EMERGENCY MEDICINE     Pt Name: Hannah Sauceda  MRN: [id number]  [place] 1995  Date of evaluation: 12/29/2022  Provider: Gab Basilio PA-C    CHIEF COMPLAINT       Chief Complaint   Patient presents with    Rib Injury     right     HISTORY OF PRESENT ILLNESS   Hannah Sauceda is a pleasant 33 y.o. male who presents to the emergency department for evaluation of right rib injury. On the night of 12-27 patient slipped at work and fell into a porcelain sink striking his right ribs. Patient does not believe he broke his ribs however his pain is worsened with stretching, pressing the area, deep breath, and bending. Patient called off work tonight due to the pain and his employer advised him to come in for x-rays. Patient denies head injury, loss of consciousness, dyspnea, neck pain, back pain, or other complaints. PASTMEDICAL HISTORY   No past medical history on file. There is no problem list on file for this patient. SURGICAL HISTORY     No past surgical history on file. CURRENT MEDICATIONS       Previous Medications    IBUPROFEN (IBU) 400 MG TABLET    Take 1 tablet by mouth every 6 hours as needed for Pain    ONDANSETRON (ZOFRAN ODT) 4 MG DISINTEGRATING TABLET    Take 1 tablet by mouth every 8 hours as needed for Nausea       ALLERGIES     has No Known Allergies. FAMILY HISTORY     has no family status information on file. SOCIAL HISTORY          PHYSICAL EXAM       ED Triage Vitals [12/29/22 1824]   BP Temp Temp Source Heart Rate Resp SpO2 Height Weight   (!) 144/79 98.1 °F (36.7 °C) Oral 69 18 98 % -- --       Additional Vital Signs:  Vitals:    12/29/22 1824   BP: (!) 144/79   Pulse: 69   Resp: 18   Temp: 98.1 °F (36.7 °C)   SpO2: 98%     Physical Exam  Vitals and nursing note reviewed. Constitutional:       General: He is not in acute distress. Appearance: He is well-developed. He is obese. He is not toxic-appearing or diaphoretic.    HENT: Head: Normocephalic and atraumatic. Right Ear: Hearing normal.      Left Ear: Hearing normal.      Nose: Nose normal. No rhinorrhea. Mouth/Throat:      Pharynx: Uvula midline. No oropharyngeal exudate. Eyes:      General: Lids are normal. No scleral icterus. Conjunctiva/sclera: Conjunctivae normal.      Pupils: Pupils are equal, round, and reactive to light. Neck:      Trachea: No tracheal deviation. Cardiovascular:      Rate and Rhythm: Normal rate and regular rhythm. Heart sounds: Normal heart sounds. No murmur heard. Pulmonary:      Effort: Pulmonary effort is normal. No respiratory distress. Breath sounds: Normal breath sounds. No stridor. No decreased breath sounds or wheezing. Chest:      Chest wall: Tenderness present. No swelling or crepitus. Abdominal:      General: There is no distension. Palpations: Abdomen is soft. Abdomen is not rigid. Tenderness: There is no abdominal tenderness. There is no guarding. Musculoskeletal:         General: Normal range of motion. Cervical back: Normal range of motion and neck supple. No rigidity. Lymphadenopathy:      Cervical: No cervical adenopathy. Skin:     General: Skin is warm and dry. Coloration: Skin is not pale. Findings: No rash. Neurological:      Mental Status: He is alert and oriented to person, place, and time. GCS: GCS eye subscore is 4. GCS verbal subscore is 5. GCS motor subscore is 6. Gait: Gait normal.      Comments: No gross deficits observed   Psychiatric:         Mood and Affect: Mood normal.         Speech: Speech normal.         Behavior: Behavior normal.         Thought Content: Thought content normal.       FORMAL DIAGNOSTIC RESULTS     RADIOLOGY: Interpretation per the Radiologist below, if available at the time of this note (none if blank):    XR RIBS RIGHT INCLUDE CHEST (MIN 3 VIEWS)   Final Result   Impression:   No fracture. Clear lungs.       This document has been electronically signed by: Senthil Howell MD on    12/29/2022 08:21 PM          LABS: (none if blank)  Labs Reviewed - No data to display    (Any cultures that may have been sent were not resulted at the time of this patient visit)    [address] / ED COURSE:     1) Number and Complexity of Problems            Problem List This Visit:         Chief Complaint   Patient presents with    Rib Injury     right   Mechanical injury        Differential Diagnosis includes (but not limited to):  Rib fracture, chest wall contusion, sprain        Diagnoses Considered but I have low suspicion of:   Pneumothorax, hemothorax, lung contusion             Pertinent Comorbid Conditions:    Obesity    2)  Data Reviewed (none if left blank)          My Independent interpretations:     EKG:      None    Imaging: No fracture or acute process    Labs:      None                 Decision Rules/Clinical Scores utilized: None            External Documentation Reviewed:         None             See Formal Diagnostic Results above for the lab and radiology tests and orders. 3)  Treatment and Disposition         ED Reassessment: Patient remained stable with no respiratory distress. Case discussed with consulting clinician: No         Shared Decision-Making was performed and disposition discussed with the        Patient/Family and questions answered. Patient will return to work on his next scheduled shift. Social determinants of health impacting treatment or disposition: None         Code Status:        Summary of Patient Presentation:      CECY  /   Jamee Winn Reviewed:    Vitals:    12/29/22 1824   BP: (!) 144/79   Pulse: 69   Resp: 18   Temp: 98.1 °F (36.7 °C)   TempSrc: Oral   SpO2: 98%       The patient was seen and examined. Appropriate diagnostic testing was performed and results reviewed with the patient. The results of pertinent diagnostic studies and exam findings were discussed.  The patients provisional diagnosis and plan of care were discussed with the patient and present family who expressed understanding. Patient declined medications while in the ER. Any medications were reviewed and indications and risks of medications were discussed with the patient /family present. Strict verbal and written return precautions, instructions and appropriate follow-up provided to  the patient. ED Medications administered this visit:  (None if blank)  Medications - No data to display  Patient declined    PROCEDURES: (None if blank)  Procedures:     CRITICAL CARE: (None if blank)      DISCHARGE PRESCRIPTIONS: (None if blank)  New Prescriptions    No medications on file       FINAL IMPRESSION      1. Rib contusion, right, initial encounter    2.  Fall on same level from slipping, tripping or stumbling, initial encounter          DISPOSITION/PLAN   DISPOSITION Decision To Discharge 12/29/2022 08:28:08 PM      OUTPATIENT FOLLOW UP THE PATIENT:  [address] [facility] [address] [phone]  Schedule an appointment as soon as possible for a visit       SALVADOR Arango [place]  12/29/22 2034

## 2022-12-30 NOTE — DISCHARGE INSTRUCTIONS
You can take ibuprofen 800 mg every 6-8 hours for pain. Additionally you can buy Lidoderm 4% pain patches over-the-counter and apply over the painful area. Ice packs can be used for pain.

## 2025-05-20 NOTE — ED TRIAGE NOTES
Procedure scheduled for 12:00 pm.   To ED from home with complaints of urinary burning. Pt states symptoms have been ongoing for the past day. Pt states his fiance thinks he has a kidney stone because he had sediment in his urine. Pt states that since he has been staying hydrated today pain is not as bad.